# Patient Record
Sex: FEMALE | Race: ASIAN | NOT HISPANIC OR LATINO | Employment: PART TIME | URBAN - NONMETROPOLITAN AREA
[De-identification: names, ages, dates, MRNs, and addresses within clinical notes are randomized per-mention and may not be internally consistent; named-entity substitution may affect disease eponyms.]

---

## 2019-10-30 PROBLEM — R92.8 ABNORMAL MAMMOGRAM: Status: ACTIVE | Noted: 2019-10-30

## 2022-01-10 PROBLEM — K21.9 GASTROESOPHAGEAL REFLUX DISEASE: Status: ACTIVE | Noted: 2022-01-10

## 2022-01-10 PROBLEM — R12 HEARTBURN: Status: ACTIVE | Noted: 2022-01-10

## 2022-08-05 PROBLEM — R12 HEARTBURN: Status: RESOLVED | Noted: 2022-01-10 | Resolved: 2022-08-05

## 2023-05-05 PROBLEM — N95.1 PERIMENOPAUSE: Status: ACTIVE | Noted: 2023-05-05

## 2023-09-29 PROBLEM — M47.816 ARTHRITIS, LUMBAR SPINE: Status: ACTIVE | Noted: 2023-09-29

## 2023-09-29 PROBLEM — N64.4 BREAST PAIN, LEFT: Status: ACTIVE | Noted: 2023-09-29

## 2024-04-10 ENCOUNTER — OFFICE VISIT (OUTPATIENT)
Dept: MEDICAL GROUP | Facility: PHYSICIAN GROUP | Age: 54
End: 2024-04-10
Payer: COMMERCIAL

## 2024-04-10 VITALS
TEMPERATURE: 96.9 F | OXYGEN SATURATION: 96 % | HEIGHT: 62 IN | BODY MASS INDEX: 23.17 KG/M2 | DIASTOLIC BLOOD PRESSURE: 84 MMHG | WEIGHT: 125.88 LBS | RESPIRATION RATE: 16 BRPM | SYSTOLIC BLOOD PRESSURE: 124 MMHG | HEART RATE: 86 BPM

## 2024-04-10 DIAGNOSIS — K21.9 GASTROESOPHAGEAL REFLUX DISEASE, UNSPECIFIED WHETHER ESOPHAGITIS PRESENT: ICD-10-CM

## 2024-04-10 DIAGNOSIS — R63.1 POLYDIPSIA: ICD-10-CM

## 2024-04-10 DIAGNOSIS — Z13.220 LIPID SCREENING: ICD-10-CM

## 2024-04-10 DIAGNOSIS — Z11.59 NEED FOR HEPATITIS C SCREENING TEST: ICD-10-CM

## 2024-04-10 DIAGNOSIS — Z01.419 ENCOUNTER FOR GYNECOLOGICAL EXAMINATION WITHOUT ABNORMAL FINDING: ICD-10-CM

## 2024-04-10 PROCEDURE — 99214 OFFICE O/P EST MOD 30 MIN: CPT | Mod: 25 | Performed by: STUDENT IN AN ORGANIZED HEALTH CARE EDUCATION/TRAINING PROGRAM

## 2024-04-10 PROCEDURE — 3079F DIAST BP 80-89 MM HG: CPT | Performed by: STUDENT IN AN ORGANIZED HEALTH CARE EDUCATION/TRAINING PROGRAM

## 2024-04-10 PROCEDURE — 99386 PREV VISIT NEW AGE 40-64: CPT | Performed by: STUDENT IN AN ORGANIZED HEALTH CARE EDUCATION/TRAINING PROGRAM

## 2024-04-10 PROCEDURE — 3074F SYST BP LT 130 MM HG: CPT | Performed by: STUDENT IN AN ORGANIZED HEALTH CARE EDUCATION/TRAINING PROGRAM

## 2024-04-10 ASSESSMENT — ENCOUNTER SYMPTOMS
VOMITING: 0
BLOOD IN STOOL: 0
CHILLS: 0
NAUSEA: 0
HEADACHES: 0
CONSTIPATION: 0
ABDOMINAL PAIN: 0
FOCAL WEAKNESS: 0
SHORTNESS OF BREATH: 0
ORTHOPNEA: 0
WHEEZING: 0
PALPITATIONS: 0
COUGH: 0
FEVER: 0
HEARTBURN: 1
DIZZINESS: 0

## 2024-04-10 ASSESSMENT — PATIENT HEALTH QUESTIONNAIRE - PHQ9: CLINICAL INTERPRETATION OF PHQ2 SCORE: 0

## 2024-04-10 ASSESSMENT — FIBROSIS 4 INDEX: FIB4 SCORE: 0.87

## 2024-04-11 NOTE — PROGRESS NOTES
Verbal Consent given for GIN recording software    HISTORY OF PRESENT ILLNESS: Vandana is a pleasant 54 y.o. female, new  patient who presents today to discuss medical problems as listed below:    History of Present Illness  The patient is a 54-year-old female here to establish care. She is a new patient.    The patient was previously under the care of a gastroenterologist approximately 1.5 years ago for gastroesophageal reflux disease (GERD). She was prescribed omeprazole 20 mg daily for a duration of 30 days, with instructions to take the medication as needed thereafter. Despite adhering to a strict diet during her trip to [place], she reports a perceived lack of recovery from stress and increased acid production upon her return. She expresses uncertainty regarding dietary modifications. Currently, she consumes a small amount of food upon waking around 3 or 4 am, with a craving for food. Her last meal of the day is at 8 pm, and she remains awake until approximately 2 or 3 am. She denies the presence of melena or vomiting.    The patient has been experiencing  polydipsia for the past 3 weeks. She is uncertain if these symptoms are related to her gastrointestinal issues or elevated blood glucose levels, given her family history of diabetes in both parents. She also reports frequent urination.    The patient reports experiencing fatigue, which she attributes to inadequate sleep. She denies snoring but reports nocturnal awakenings, which she attributes to increased water intake and subsequent difficulty returning to sleep.    The patient has a history of hypertension, which is well-managed with daily amlodipine 5 mg.    The patient has a history of a breast biopsy performed 2 years ago, which yielded normal results. Her last mammogram was conducted in 09/2023 and 10/2023, both of which returned normal results. She reports intermittent breast pain, the cause of which is uncertain, but remains uncertain.    The  patient has not undergone a Pap smear recently. She underwent a hysterectomy, but a small cyst was discovered approximately 1 year ago. A transvaginal ultrasound performed 6 months ago yielded normal results. She questions the necessity of a Pap smear, as she retains her left ovary. She does not currently have a gynecologist. She has no history of pregnancies. She underwent a Cologuard test in 2023, which yielded negative results. She maintains regular dental check-ups.   She denies any family history of breast or ovarian cancer. Both of her parents have diabetes.   She has received her first shingles vaccine.       Current Outpatient Medications Ordered in Epic   Medication Sig Dispense Refill    amLODIPine (NORVASC) 5 MG Tab Take 1 Tablet by mouth every day. 90 Tablet 0    omeprazole (PRILOSEC) 20 MG delayed-release capsule Take 1 Capsule by mouth every day. 90 Capsule 3    Coenzyme Q10 (CO Q 10) 100 MG Cap Take  by mouth.      ondansetron (ZOFRAN ODT) 4 MG TABLET DISPERSIBLE Take 1 Tablet by mouth every 8 hours as needed for Nausea. 9 Tablet 3    cyclobenzaprine (FLEXERIL) 5 mg tablet Take 1-2 Tablets by mouth 3 times a day as needed. 30 tablet 0    VITAMIN E PO Take  by mouth. (Patient not taking: Reported on 4/10/2024)      TURMERIC PO Take  by mouth. (Patient not taking: Reported on 4/10/2024)       No current [facility]-ordered facility-administered medications on file.       Review of systems:  Review of Systems   Constitutional:  Negative for chills and fever.   Respiratory:  Negative for cough, shortness of breath and wheezing.    Cardiovascular:  Negative for chest pain, palpitations, orthopnea and leg swelling.   Gastrointestinal:  Positive for heartburn. Negative for abdominal pain, blood in stool, constipation, melena, nausea and vomiting.   Genitourinary:  Negative for dysuria.   Musculoskeletal:  Negative for joint pain.   Neurological:  Negative for dizziness, focal weakness and headaches.         Patient  Active Problem List    Diagnosis Date Noted    Arthritis, lumbar spine 2023    Breast pain, left 2023    Perimenopause 2023    Gastroesophageal reflux disease 01/10/2022    Abnormal mammogram- both breasts  Rt  birads 2/ Lt birads 3 recommend repeat 2020 10/30/2019    Colon cancer screening 2013    Sensitive skin     H/O breast biopsy     Hypertension 2013     Past Surgical History:   Procedure Laterality Date    OOPHORECTOMY  1994    right-done in [facility]     Social History     Tobacco Use    Smoking status: Former     Current packs/day: 0.00     Average packs/day: 0.3 packs/day for 10.0 years (2.5 ttl pk-yrs)     Types: Cigarettes     Start date: 2003     Quit date: 2013     Years since quittin.2    Smokeless tobacco: Never   Vaping Use    Vaping Use: Never used   Substance Use Topics    Alcohol use: Yes     Comment: 1 drink every 2 weeks or so    Drug use: No      Family History   Problem Relation Age of Onset    Hypertension Mother     Diabetes Mother     Hypertension Father     Diabetes Brother      Current Outpatient Medications   Medication Sig Dispense Refill    amLODIPine (NORVASC) 5 MG Tab Take 1 Tablet by mouth every day. 90 Tablet 0    omeprazole (PRILOSEC) 20 MG delayed-release capsule Take 1 Capsule by mouth every day. 90 Capsule 3    Coenzyme Q10 (CO Q 10) 100 MG Cap Take  by mouth.      ondansetron (ZOFRAN ODT) 4 MG TABLET DISPERSIBLE Take 1 Tablet by mouth every 8 hours as needed for Nausea. 9 Tablet 3    cyclobenzaprine (FLEXERIL) 5 mg tablet Take 1-2 Tablets by mouth 3 times a day as needed. 30 tablet 0    VITAMIN E PO Take  by mouth. (Patient not taking: Reported on 4/10/2024)      TURMERIC PO Take  by mouth. (Patient not taking: Reported on 4/10/2024)       No current facility-administered medications for this visit.       Allergies:  No Known Allergies    Allergies, past medical history, past surgical history, family history, social history  "reviewed and updated.    Objective:    /84   Pulse 86   Temp 36.1 °C (96.9 °F) (Temporal)   Resp 16   Ht 1.575 m (5' 2\")   Wt 57.1 kg (125 lb 14.1 oz)   LMP 2013   SpO2 96%   BMI 23.02 kg/m²    Body mass index is 23.02 kg/m².    Physical exam:  General: Normal appearance, no acute distress, not ill-appearing  HEENT: EOM intact, conjunctiva normal limits, negative right/left eye discharge.  Sclera anicteric  Cardiovascular: Normal rate and rhythm, no murmurs  Pulmonary: No respiratory distress, no wheezing, no rales, breath sounds normal.  Abdomen: Bowel sounds normal, flat, soft. Neg tenderness, neg murphys   Musculoskeletal: No edema bilaterally  Skin: Warm, dry, no lesions  Neurological: No focal deficits, normal gait  Psychiatric: Mood within normal limits    Assessment/Plan:    Assessment & Plan      Patient here for a preventive medicine visit today and to establish care.  -Reviewed all past medical history, family history, social history    -Diet and exercise appropriate counseling given  -Social determinants of health reviewed  -Tobacco, alcohol, recreational drug use: Reviewed no concerns  -Cholesterol screening: Ordered  -Diabetes screening: Ordered  - DEXA scan:  not indicaed    Immunizations/Infectious disease:    Immunizations: Advised on second shingles vaccine, COVID-vaccine booster    Cancer screenings:  Colorectal cancer screenin2023 cologuard negative   Cervical Cancer Screening: had hysterectomy    Breast Cancer Screening: lat mammogram was less then a 1 year ago  .          ----BRCA SCREENING: FHS-7 score: no fam hx       Ob-Gyn/ History:   /Para:   Gyn Surgery: hysterectomy           #Gastroesophageal reflux disease (GERD).  The patient's symptoms may be associated with her GERD. The dosage of Prilosec will be escalated to 40 mg. She has been counseled to avoid certain foods, such as spicy foods, acidic foods, sodas, carbonated drinks, alcohol, and " late-night eating. A referral to a gastroenterologist has been initiated. Should her symptoms persist despite the increased dosage of Prilosec, an endoscopy may be considered.    #Polydipsia  Diabetic screen disorder, CMP, TSH ordered    # Establishment of care.  Annual laboratory tests will be ordered, including diabetes screening, complete blood count (CBC), kidney function tests, liver function tests, electrolytes, thyroid function tests, and cholesterol levels. She has been advised to fast for 8 hours prior to the tests, but to consume ample water. A referral to a gynecologist will be made for general pelvic exams. She will receive her second shingles vaccine at her convenience.    Follow-up  The patient is scheduled for a follow-up visit in 2 weeks.   Patient Counseling:  --Discussed moderation in caloric intake, sufficient fresh fruits/vegetables, fiber, iron  --Discussed brushing, flossing, and dental visits.   --Encouraged regular exercise.   --Discussed tobacco, alcohol, or other drug use.  --Discussed sexually transmitted infections, partner selection  --Injury prevention: Discussed       Problem List Items Addressed This Visit       Gastroesophageal reflux disease    Relevant Orders    CBC WITHOUT DIFFERENTIAL    Referral to Gastroenterology     Other Visit Diagnoses       Polydipsia        Relevant Orders    HEMOGLOBIN A1C    Comp Metabolic Panel    TSH WITH REFLEX TO FT4    Lipid screening        Relevant Orders    Lipid Profile    Encounter for gynecological examination without abnormal finding        Relevant Orders    Referral to Gynecology    Need for hepatitis C screening test        Relevant Orders    HEP C VIRUS ANTIBODY            Return in about 2 weeks (around 4/24/2024) for labs, symptoms.

## 2024-06-06 ENCOUNTER — HOSPITAL ENCOUNTER (OUTPATIENT)
Dept: RADIOLOGY | Facility: MEDICAL CENTER | Age: 54
End: 2024-06-06
Attending: PHYSICIAN ASSISTANT
Payer: COMMERCIAL

## 2024-06-06 DIAGNOSIS — K21.00 GASTROESOPHAGEAL REFLUX DISEASE WITH ESOPHAGITIS, UNSPECIFIED WHETHER HEMORRHAGE: ICD-10-CM

## 2024-06-06 DIAGNOSIS — R11.0 NAUSEA: ICD-10-CM

## 2024-06-06 DIAGNOSIS — R14.0 GASTRIC TYMPANY: ICD-10-CM

## 2024-06-06 PROCEDURE — 76700 US EXAM ABDOM COMPLETE: CPT

## 2024-06-18 ENCOUNTER — HOSPITAL ENCOUNTER (OUTPATIENT)
Dept: RADIOLOGY | Facility: MEDICAL CENTER | Age: 54
End: 2024-06-18
Attending: PHYSICIAN ASSISTANT
Payer: COMMERCIAL

## 2024-06-18 DIAGNOSIS — K21.00 GASTROESOPHAGEAL REFLUX DISEASE WITH ESOPHAGITIS, UNSPECIFIED WHETHER HEMORRHAGE: ICD-10-CM

## 2024-06-18 DIAGNOSIS — R14.0 GASTRIC TYMPANY: ICD-10-CM

## 2024-06-18 DIAGNOSIS — R11.0 NAUSEA: ICD-10-CM

## 2024-06-18 PROCEDURE — A9541 TC99M SULFUR COLLOID: HCPCS

## 2024-06-24 PROBLEM — K22.2 ESOPHAGEAL STRICTURE: Status: ACTIVE | Noted: 2024-06-24

## 2024-06-24 PROBLEM — R63.4 UNINTENTIONAL WEIGHT LOSS: Status: ACTIVE | Noted: 2024-06-24
